# Patient Record
Sex: FEMALE | Race: WHITE | NOT HISPANIC OR LATINO
[De-identification: names, ages, dates, MRNs, and addresses within clinical notes are randomized per-mention and may not be internally consistent; named-entity substitution may affect disease eponyms.]

---

## 2018-10-14 PROBLEM — Z00.00 ENCOUNTER FOR PREVENTIVE HEALTH EXAMINATION: Status: ACTIVE | Noted: 2018-10-14

## 2019-03-18 ENCOUNTER — APPOINTMENT (OUTPATIENT)
Dept: ENDOCRINOLOGY | Facility: CLINIC | Age: 24
End: 2019-03-18

## 2021-03-16 ENCOUNTER — APPOINTMENT (OUTPATIENT)
Dept: OBGYN | Facility: CLINIC | Age: 26
End: 2021-03-16

## 2021-04-19 ENCOUNTER — APPOINTMENT (OUTPATIENT)
Dept: OBGYN | Facility: CLINIC | Age: 26
End: 2021-04-19
Payer: COMMERCIAL

## 2021-04-19 VITALS
HEART RATE: 87 BPM | HEIGHT: 61 IN | WEIGHT: 143 LBS | SYSTOLIC BLOOD PRESSURE: 131 MMHG | BODY MASS INDEX: 27 KG/M2 | DIASTOLIC BLOOD PRESSURE: 80 MMHG

## 2021-04-19 DIAGNOSIS — Z01.419 ENCOUNTER FOR GYNECOLOGICAL EXAMINATION (GENERAL) (ROUTINE) W/OUT ABNORMAL FINDINGS: ICD-10-CM

## 2021-04-19 DIAGNOSIS — N92.6 IRREGULAR MENSTRUATION, UNSPECIFIED: ICD-10-CM

## 2021-04-19 PROCEDURE — 99204 OFFICE O/P NEW MOD 45 MIN: CPT

## 2021-04-19 PROCEDURE — 99072 ADDL SUPL MATRL&STAF TM PHE: CPT

## 2021-04-19 RX ORDER — PROGESTERONE 100 MG/1
100 CAPSULE ORAL DAILY
Qty: 28 | Refills: 1 | Status: ACTIVE | COMMUNITY
Start: 2021-04-19 | End: 1900-01-01

## 2021-04-21 NOTE — HISTORY OF PRESENT ILLNESS
[FreeTextEntry1] : 25 yo femal e w irreg periods since menarche was on ocps x several years, has known microprolactinoma and hormone ratios cw pcos. didn’t want to keep taking ocps, wants to know alternatives. reports her previous gyn and her endocrinologist would not discuss alternatives with her.

## 2021-04-21 NOTE — DISCUSSION/SUMMARY
[FreeTextEntry1] : spent over 45 min in face to face consultation w pt . dw pt med tx for amenorrhea secondary to hyperprolactinemia, w brompcripting, also progesterone triggering ocf menses, ocps, progesterone containing iuds, consideration of metformin for pcos. \par pt will do progesterone withdrawal (prescribed), day 3 labwork, and rto for us and fu.

## 2021-05-18 ENCOUNTER — ASOB RESULT (OUTPATIENT)
Age: 26
End: 2021-05-18

## 2021-05-18 ENCOUNTER — APPOINTMENT (OUTPATIENT)
Dept: OBGYN | Facility: CLINIC | Age: 26
End: 2021-05-18
Payer: COMMERCIAL

## 2021-05-18 VITALS
BODY MASS INDEX: 27.38 KG/M2 | WEIGHT: 145 LBS | DIASTOLIC BLOOD PRESSURE: 84 MMHG | HEIGHT: 61 IN | SYSTOLIC BLOOD PRESSURE: 119 MMHG

## 2021-05-18 DIAGNOSIS — N91.1 SECONDARY AMENORRHEA: ICD-10-CM

## 2021-05-18 PROCEDURE — 99072 ADDL SUPL MATRL&STAF TM PHE: CPT

## 2021-05-18 PROCEDURE — 99214 OFFICE O/P EST MOD 30 MIN: CPT

## 2021-05-18 PROCEDURE — 76856 US EXAM PELVIC COMPLETE: CPT | Mod: 59

## 2021-05-18 PROCEDURE — 76830 TRANSVAGINAL US NON-OB: CPT

## 2021-05-18 RX ORDER — PROGESTERONE 100 MG/1
100 CAPSULE ORAL DAILY
Qty: 28 | Refills: 3 | Status: ACTIVE | COMMUNITY
Start: 2021-05-18 | End: 1900-01-01

## 2021-05-18 NOTE — DISCUSSION/SUMMARY
[FreeTextEntry1] : pt will use cyclic prog for now, contemplating mirena. sees endocrine, gets yearly mri for microadenoma. progesterone prescribed.

## 2021-05-18 NOTE — HISTORY OF PRESENT ILLNESS
[FreeTextEntry1] : 27 yo pt here to fu us and lw. us and lw cw pcos, mild hyperprolactinemia. dw pt bromocriptine use, ocps, iud, prog withdrawals, metformin use. pt trying to avoid  meds, feeling better off ocps. not active at present.

## 2021-06-28 ENCOUNTER — APPOINTMENT (OUTPATIENT)
Dept: OBGYN | Facility: CLINIC | Age: 26
End: 2021-06-28
Payer: COMMERCIAL

## 2021-06-28 DIAGNOSIS — Z78.9 OTHER SPECIFIED HEALTH STATUS: ICD-10-CM

## 2021-06-28 DIAGNOSIS — E28.2 POLYCYSTIC OVARIAN SYNDROME: ICD-10-CM

## 2021-06-28 DIAGNOSIS — Z92.29 PERSONAL HISTORY OF OTHER DRUG THERAPY: ICD-10-CM

## 2021-06-28 DIAGNOSIS — N94.3 PREMENSTRUAL TENSION SYNDROME: ICD-10-CM

## 2021-06-28 DIAGNOSIS — D35.2 BENIGN NEOPLASM OF PITUITARY GLAND: ICD-10-CM

## 2021-06-28 DIAGNOSIS — Z80.3 FAMILY HISTORY OF MALIGNANT NEOPLASM OF BREAST: ICD-10-CM

## 2021-06-28 DIAGNOSIS — Z86.39 PERSONAL HISTORY OF OTHER ENDOCRINE, NUTRITIONAL AND METABOLIC DISEASE: ICD-10-CM

## 2021-06-28 DIAGNOSIS — E22.9 BENIGN NEOPLASM OF PITUITARY GLAND: ICD-10-CM

## 2021-06-28 DIAGNOSIS — Z80.49 FAMILY HISTORY OF MALIGNANT NEOPLASM OF OTHER GENITAL ORGANS: ICD-10-CM

## 2021-06-28 PROCEDURE — 99214 OFFICE O/P EST MOD 30 MIN: CPT | Mod: 95

## 2021-06-28 PROCEDURE — 99204 OFFICE O/P NEW MOD 45 MIN: CPT | Mod: 95

## 2021-06-28 RX ORDER — LEVOTHYROXINE SODIUM 50 UG/1
50 TABLET ORAL
Qty: 90 | Refills: 0 | Status: ACTIVE | COMMUNITY
Start: 2021-03-29

## 2021-06-28 RX ORDER — OMEGA-3/DHA/EPA/FISH OIL 300-1000MG
CAPSULE ORAL
Refills: 0 | Status: ACTIVE | COMMUNITY

## 2021-06-28 RX ORDER — CALCIUM CARBONATE/VITAMIN D3 600 MG-10
TABLET ORAL
Refills: 0 | Status: ACTIVE | COMMUNITY

## 2021-06-28 RX ORDER — ESCITALOPRAM OXALATE 10 MG/1
10 TABLET ORAL
Qty: 90 | Refills: 0 | Status: ACTIVE | COMMUNITY
Start: 2021-06-18

## 2021-06-28 RX ORDER — NORGESTIMATE AND ETHINYL ESTRADIOL 0.25-0.035
0.25-35 KIT ORAL
Qty: 28 | Refills: 0 | Status: DISCONTINUED | COMMUNITY
Start: 2020-12-14

## 2021-06-28 RX ORDER — CHOLINE 650 MG
TABLET ORAL
Refills: 0 | Status: ACTIVE | COMMUNITY

## 2021-06-28 NOTE — PLAN
[FreeTextEntry1] : ANXIETY ON LEXAPRO, CONSIDER CHANGE IN DOSE OR MEDICATION, DIFFERENTIAL DOSING WITH PMS DISCUSSED.\par \par IRREGULAR MENSES WITH PROBABLE PCOS, NOW OFF OC'S, CHART MENSES, SUPPLEMENTS DISCUSSED, LABS REVIEWED.\par \par CONSIDER SCREENING PAP, OBTAIN ROR DR. JOHNSON, NONE SEEN.\par \par ANNUAL VISIT POSSIBLY WITH PAP 1/2021, DR. JOHNSON,  PAP  NOT ON CHART.

## 2021-06-28 NOTE — HISTORY OF PRESENT ILLNESS
[Home] : at home, [unfilled] , at the time of the visit. [Other Location: e.g. Home (Enter Location, City,State)___] : at [unfilled] [Mother] : mother [Verbal consent obtained from patient] : the patient, [unfilled] [FreeTextEntry1] : PATIENT SEEN RECENTLY AND SELF DC'ED OC'S 3/2021.  WAS ON OC'S FOR 3 YEARS DUE TO IRREGULAR MENSES.  DIAGNOSED WITH PCOS.  RESPONDED TO PROGESTERONE CHALLENGE THIS YEAR.  HAS SINCE HAD 1 NORMAL MENSES, SPONTANEOUS, FOLLOWING ACUPUNCTURE TREATMENTS.  HAS A PITUITARY MICROADENOMA AND IS ON SYNTHROID FOR HYPOTHYROIDISM.\par \par LABS REVIEWED, C/W POSSIBLE PCOS, AS WAS SONOGRAM. BORDERLINE TESTOSTERONE AND PROLACTIN WITH FLIPPED LH/FSH RATIO, MULTIFOLLICULAR OVARIAN CYSTS.\par \par STATES PMS SYMPTOMS, MOODS, OVERLYING DEPRESSION.\par \par NEVER S/A.\par \par DISCUSSED PRECAUTIONS AGAINST COVID19.  DISCUSSED AND RECOMMENDED VACCINATION.\par